# Patient Record
Sex: OTHER/UNKNOWN | URBAN - METROPOLITAN AREA
[De-identification: names, ages, dates, MRNs, and addresses within clinical notes are randomized per-mention and may not be internally consistent; named-entity substitution may affect disease eponyms.]

---

## 2018-05-10 ENCOUNTER — TELEPHONE (OUTPATIENT)
Dept: OTHER | Facility: CLINIC | Age: 53
End: 2018-05-10

## 2018-05-10 NOTE — TELEPHONE ENCOUNTER
5/10/2018    Call Regarding [facility] [employer] choices    Attempt 1    Message busy tone    Comments: 1 spouse dep      Outreach   Nona Max

## 2018-05-30 NOTE — TELEPHONE ENCOUNTER
5/30/2018      [employer]- invalid number on chart.             Outreach ,  Bhargav Solorzano